# Patient Record
Sex: MALE | Race: BLACK OR AFRICAN AMERICAN | Employment: FULL TIME | URBAN - METROPOLITAN AREA
[De-identification: names, ages, dates, MRNs, and addresses within clinical notes are randomized per-mention and may not be internally consistent; named-entity substitution may affect disease eponyms.]

---

## 2019-05-27 ENCOUNTER — OFFICE VISIT (OUTPATIENT)
Dept: URGENT CARE | Facility: URGENT CARE | Age: 69
End: 2019-05-27
Payer: COMMERCIAL

## 2019-05-27 VITALS
OXYGEN SATURATION: 97 % | RESPIRATION RATE: 12 BRPM | BODY MASS INDEX: 29.4 KG/M2 | DIASTOLIC BLOOD PRESSURE: 88 MMHG | SYSTOLIC BLOOD PRESSURE: 150 MMHG | HEIGHT: 68 IN | HEART RATE: 72 BPM | TEMPERATURE: 96.5 F | WEIGHT: 194 LBS

## 2019-05-27 DIAGNOSIS — M10.9 PODAGRA: Primary | ICD-10-CM

## 2019-05-27 DIAGNOSIS — M79.672 LEFT FOOT PAIN: ICD-10-CM

## 2019-05-27 PROCEDURE — 99214 OFFICE O/P EST MOD 30 MIN: CPT | Performed by: PHYSICIAN ASSISTANT

## 2019-05-27 RX ORDER — METHYLPREDNISOLONE 4 MG
TABLET, DOSE PACK ORAL
Qty: 21 TABLET | Refills: 0 | Status: SHIPPED | OUTPATIENT
Start: 2019-05-27 | End: 2019-06-25

## 2019-05-27 RX ORDER — IBUPROFEN 200 MG
300 TABLET ORAL EVERY 4 HOURS PRN
COMMUNITY
End: 2019-06-25

## 2019-05-27 ASSESSMENT — PAIN SCALES - GENERAL: PAINLEVEL: WORST PAIN (10)

## 2019-05-27 ASSESSMENT — MIFFLIN-ST. JEOR: SCORE: 1614.48

## 2019-05-28 NOTE — PROGRESS NOTES
"SUBJECTIVE:  Chief Complaint   Patient presents with     Urgent Care     Foot Pain     Pain in left foot x 1wk  Hx of gout     Young Dickson is a 69 year old male presents with a chief complaint of left foot pain, swelling and tenderness.  How: no injury.  The patient complained of mild and moderate pain  and has not had decreased ROM.  Pain exacerbated by walking and weight-bearing.  Relieved by rest.  He treated it initially with no therapy. This is the first time this type of injury has occurred to this patient.     PAST MEDICAL HX  Gout    ALLERGIES  No Known Allergies     Social History     Tobacco Use     Smoking status: Never Smoker     Smokeless tobacco: Never Used   Substance Use Topics     Alcohol use: Not on file     FAMILY HX  HTN  CVD  Depression    ROS:  CONSTITUTIONAL:NEGATIVE for fever, chills, change in weight  INTEGUMENTARY/SKIN: POSITIVE for left foot, MTP joint pain, swelling  ENT/MOUTH: NEGATIVE for ear, mouth and throat problems  RESP:NEGATIVE for significant cough or SOB  CV: NEGATIVE for chest pain, palpitations or peripheral edema  GI: NEGATIVE for nausea, abdominal pain, heartburn, or change in bowel habits  : negative for  MUSCULOSKELETAL: POSITIVE  for left MTP joint tenderness, swelling and erythema  NEURO: NEGATIVE for weakness, dizziness or paresthesias    EXAM:   /88 (BP Location: Right arm, Patient Position: Sitting, Cuff Size: Adult Large)   Pulse 72   Temp 96.5  F (35.8  C) (Tympanic)   Resp 12   Ht 1.727 m (5' 8\")   Wt 88 kg (194 lb)   SpO2 97%   BMI 29.50 kg/m    Gen: healthy,alert,no distress  Extremity: foot has erythema, swelling and point tenderness of the left MTP joint.   There is not compromise to the distal circulation.  Pulses are +2 and CRT is brisk  CHEST: clear to auscultation  CV: regular rate and rhythm  EXTREMITIES: peripheral pulses normal  MS: no gross deformities noted, no evidence of inflammation in joints, FROM in all extremities.  SKIN: " Positive left MTP joint erythema, swelling  NEURO: Normal strength and tone, sensory exam grossly normal, mentation intact and speech normal    ASSESSMENT/PLAN      ICD-10-CM    1. Podagra M10.9 methylPREDNISolone (MEDROL DOSEPAK) 4 MG tablet therapy pack   2. Left foot pain M79.672 methylPREDNISolone (MEDROL DOSEPAK) 4 MG tablet therapy pack       Gout information given to patient  Medrol for gout attack   Do not take allopurinol for active gout flare up  Follow up with PCP

## 2019-06-25 ENCOUNTER — OFFICE VISIT (OUTPATIENT)
Dept: FAMILY MEDICINE | Facility: CLINIC | Age: 69
End: 2019-06-25
Payer: COMMERCIAL

## 2019-06-25 VITALS
OXYGEN SATURATION: 99 % | TEMPERATURE: 98.5 F | WEIGHT: 193 LBS | HEIGHT: 68 IN | HEART RATE: 92 BPM | DIASTOLIC BLOOD PRESSURE: 90 MMHG | SYSTOLIC BLOOD PRESSURE: 162 MMHG | BODY MASS INDEX: 29.25 KG/M2

## 2019-06-25 DIAGNOSIS — R03.0 ELEVATED BP WITHOUT DIAGNOSIS OF HYPERTENSION: ICD-10-CM

## 2019-06-25 DIAGNOSIS — M79.671 RIGHT FOOT PAIN: Primary | ICD-10-CM

## 2019-06-25 PROCEDURE — 99213 OFFICE O/P EST LOW 20 MIN: CPT | Performed by: PHYSICIAN ASSISTANT

## 2019-06-25 ASSESSMENT — MIFFLIN-ST. JEOR: SCORE: 1609.94

## 2019-06-25 NOTE — PATIENT INSTRUCTIONS
History/exam is most consistent with plantar fasciitis.  Please slowly add back your new exercise routine once pain has resolved.  Start icing this consistently, ensure good footwear and ok to continue use of ibuprofen for now.   Unclear if your BP is elevated due to pain versus if you have underlying hypertension.  This needs following up on.  Start tracking BP on home cuff and keep a log of this.  Return in 3 weeks or so to review BP log and come fasting for routine labs.  You should have lipids, kidney function and diabetes screening all completed at that time.

## 2019-06-25 NOTE — PROGRESS NOTES
"Subjective     Young Dickson is a 69 year old male who presents to clinic today for the following health issues:    HPI   Joint Pain    Onset: started Thursday    Description:   Location: right foot pain  Character: can't walk because it hurts     Intensity: severe at times, but today is feeling better.    Progression of Symptoms: same    Accompanying Signs & Symptoms:  Other symptoms: no swelling or bruising    History:   Previous similar pain: no     Does mention he was seen about 1 month ago for L foot pain/gout, but this pain is different and is not his joint.  Generally enjoys good health, but admits he never comes in unless he has a problem. No routine preventative health in a long time.     Precipitating factors:   Trauma or overuse: YES - just started going to the gym once per week 2 months ago. Using the treadmill for 30 minutes.     Alleviating factors:  Improved by: He has been taking ibuprofen and that is helping - if he doesn't take it he can't walk on it    Therapies Tried and outcome: ibuprofen 200mg BID since Friday and this really helps.       Patient Active Problem List   Diagnosis     Elevated BP without diagnosis of hypertension     History reviewed. No pertinent surgical history.    Social History     Tobacco Use     Smoking status: Never Smoker     Smokeless tobacco: Never Used   Substance Use Topics     Alcohol use: Not on file     History reviewed. No pertinent family history.      No current outpatient medications on file.     No Known Allergies    Reviewed and updated as needed this visit by Provider  Tobacco  Allergies  Meds  Problems  Med Hx  Surg Hx  Fam Hx  Soc Hx          Review of Systems   ROS COMP: Constitutional, HEENT, cardiovascular, pulmonary, MSK, skin systems are negative, except as otherwise noted.      Objective    /90 (BP Location: Right arm, Cuff Size: Adult Regular)   Pulse 92   Temp 98.5  F (36.9  C) (Oral)   Ht 1.727 m (5' 8\")   Wt 87.5 kg (193 lb)   " SpO2 99%   BMI 29.35 kg/m    Body mass index is 29.35 kg/m .  Physical Exam   GENERAL: healthy, alert and no distress  MS: no swelling, redness or warmth of R foot. Indicates plantar aspect of primary site of pain and is tender to palpation from mid arch to distal insertion on heel and has discomfort with passive dorsiflexion along areas of same. No ankle tenderness or pain at base of the 5th MTP.  VASC: normal DP and PT pulses.     Diagnostic Test Results:  none         Assessment & Plan       ICD-10-CM    1. Right foot pain M79.671    2. Elevated BP without diagnosis of hypertension R03.0    See Patient Instructions  Patient in agreement with plan.     Patient Instructions   History/exam is most consistent with plantar fasciitis.  Please slowly add back your new exercise routine once pain has resolved.  Start icing this consistently, ensure good footwear and ok to continue use of ibuprofen for now.   Unclear if your BP is elevated due to pain versus if you have underlying hypertension.  This needs following up on.  Start tracking BP on home cuff and keep a log of this.  Return in 3 weeks or so to review BP log and come fasting for routine labs.  You should have lipids, kidney function and diabetes screening all completed at that time.       Return in about 1 month (around 7/25/2019) for BP Recheck, Lab Work.    Jolynn Araiza PA-C  [facility]

## 2019-07-26 ENCOUNTER — OFFICE VISIT (OUTPATIENT)
Dept: FAMILY MEDICINE | Facility: CLINIC | Age: 69
End: 2019-07-26
Payer: COMMERCIAL

## 2019-07-26 VITALS
HEIGHT: 68 IN | TEMPERATURE: 98.1 F | WEIGHT: 188 LBS | DIASTOLIC BLOOD PRESSURE: 80 MMHG | BODY MASS INDEX: 28.49 KG/M2 | SYSTOLIC BLOOD PRESSURE: 156 MMHG | HEART RATE: 88 BPM | OXYGEN SATURATION: 99 %

## 2019-07-26 DIAGNOSIS — Z11.59 NEED FOR HEPATITIS C SCREENING TEST: ICD-10-CM

## 2019-07-26 DIAGNOSIS — Z23 NEED FOR TETANUS BOOSTER: ICD-10-CM

## 2019-07-26 DIAGNOSIS — I10 HYPERTENSION GOAL BP (BLOOD PRESSURE) < 140/90: Primary | ICD-10-CM

## 2019-07-26 DIAGNOSIS — Z23 NEED FOR PNEUMOCOCCAL VACCINATION: ICD-10-CM

## 2019-07-26 DIAGNOSIS — Z12.5 SCREENING FOR PROSTATE CANCER: ICD-10-CM

## 2019-07-26 DIAGNOSIS — Z13.6 CARDIOVASCULAR SCREENING; LDL GOAL LESS THAN 160: ICD-10-CM

## 2019-07-26 DIAGNOSIS — E66.3 OVERWEIGHT (BMI 25.0-29.9): ICD-10-CM

## 2019-07-26 LAB
ALBUMIN SERPL-MCNC: 3.7 G/DL (ref 3.4–5)
ALP SERPL-CCNC: 73 U/L (ref 40–150)
ALT SERPL W P-5'-P-CCNC: 27 U/L (ref 0–70)
ANION GAP SERPL CALCULATED.3IONS-SCNC: 3 MMOL/L (ref 3–14)
AST SERPL W P-5'-P-CCNC: 15 U/L (ref 0–45)
BILIRUB SERPL-MCNC: 0.5 MG/DL (ref 0.2–1.3)
BUN SERPL-MCNC: 10 MG/DL (ref 7–30)
CALCIUM SERPL-MCNC: 8.4 MG/DL (ref 8.5–10.1)
CHLORIDE SERPL-SCNC: 108 MMOL/L (ref 94–109)
CHOLEST SERPL-MCNC: 189 MG/DL
CO2 SERPL-SCNC: 26 MMOL/L (ref 20–32)
CREAT SERPL-MCNC: 0.93 MG/DL (ref 0.66–1.25)
CREAT UR-MCNC: NORMAL MG/DL
GFR SERPL CREATININE-BSD FRML MDRD: 83 ML/MIN/{1.73_M2}
GLUCOSE SERPL-MCNC: 91 MG/DL (ref 70–99)
HDLC SERPL-MCNC: 52 MG/DL
LDLC SERPL CALC-MCNC: 122 MG/DL
MICROALBUMIN UR-MCNC: NORMAL MG/L
MICROALBUMIN/CREAT UR: NORMAL MG/G CR (ref 0–17)
NONHDLC SERPL-MCNC: 137 MG/DL
POTASSIUM SERPL-SCNC: 4.2 MMOL/L (ref 3.4–5.3)
PROT SERPL-MCNC: 7.4 G/DL (ref 6.8–8.8)
PSA SERPL-ACNC: 0.81 UG/L (ref 0–4)
SODIUM SERPL-SCNC: 137 MMOL/L (ref 133–144)
TRIGL SERPL-MCNC: 74 MG/DL

## 2019-07-26 PROCEDURE — 80061 LIPID PANEL: CPT | Performed by: PHYSICIAN ASSISTANT

## 2019-07-26 PROCEDURE — G0103 PSA SCREENING: HCPCS | Performed by: PHYSICIAN ASSISTANT

## 2019-07-26 PROCEDURE — 99213 OFFICE O/P EST LOW 20 MIN: CPT | Mod: 25 | Performed by: PHYSICIAN ASSISTANT

## 2019-07-26 PROCEDURE — 90715 TDAP VACCINE 7 YRS/> IM: CPT | Performed by: PHYSICIAN ASSISTANT

## 2019-07-26 PROCEDURE — 36415 COLL VENOUS BLD VENIPUNCTURE: CPT | Performed by: PHYSICIAN ASSISTANT

## 2019-07-26 PROCEDURE — 90471 IMMUNIZATION ADMIN: CPT | Performed by: PHYSICIAN ASSISTANT

## 2019-07-26 PROCEDURE — 80053 COMPREHEN METABOLIC PANEL: CPT | Performed by: PHYSICIAN ASSISTANT

## 2019-07-26 PROCEDURE — 90670 PCV13 VACCINE IM: CPT | Performed by: PHYSICIAN ASSISTANT

## 2019-07-26 PROCEDURE — 86803 HEPATITIS C AB TEST: CPT | Performed by: PHYSICIAN ASSISTANT

## 2019-07-26 PROCEDURE — 90472 IMMUNIZATION ADMIN EACH ADD: CPT | Performed by: PHYSICIAN ASSISTANT

## 2019-07-26 RX ORDER — LISINOPRIL 20 MG/1
20 TABLET ORAL DAILY
Qty: 30 TABLET | Refills: 1 | Status: SHIPPED | OUTPATIENT
Start: 2019-07-26 | End: 2019-07-29 | Stop reason: SINTOL

## 2019-07-26 ASSESSMENT — MIFFLIN-ST. JEOR: SCORE: 1587.26

## 2019-07-26 NOTE — PROGRESS NOTES
Subjective     Young Dickson is a 69 year old male who presents to clinic today for the following health issues:    HPI   Hypertension Follow-up; was advised to track BPs since last appt and return for review today. Unfortunately, he has only checked his BP once, but does report that it was 166 systolic.  Recheck today again shows elevation of 156/80 so reviewed that he likely has underlying HTN and due to potential negative risks of not treating this a medication would be indicated. He is amenable to this.  Fasting for labs.   Drinks 2 glasses of wine per night.  Not sure if he snores. No known apnea.   Sochx: [place]      Do you check your blood pressure regularly outside of the clinic? No     Are you following a low salt diet? No    Are your blood pressures ever more than 140 on the top number (systolic) OR more   than 90 on the bottom number (diastolic), for example 140/90? Yes    Amount of exercise or physical activity: None    Problems taking medications regularly: n/a    Medication side effects: n/a    Diet: regular (no restrictions)      Patient Active Problem List   Diagnosis     Overweight (BMI 25.0-29.9)     Hypertension goal BP (blood pressure) < 140/90     History reviewed. No pertinent surgical history.    Social History     Tobacco Use     Smoking status: Never Smoker     Smokeless tobacco: Never Used   Substance Use Topics     Alcohol use: Not on file     History reviewed. No pertinent family history.      Current Outpatient Medications   Medication Sig Dispense Refill     lisinopril (PRINIVIL/ZESTRIL) 20 MG tablet Take 1 tablet (20 mg) by mouth daily 30 tablet 1     No Known Allergies      Reviewed and updated as needed this visit by Provider  Tobacco  Allergies  Meds  Problems  Med Hx  Surg Hx  Fam Hx  Soc Hx          Review of Systems   ROS COMP: Constitutional, HEENT, cardiovascular, pulmonary, gi and gu systems are negative, except as otherwise noted.      Objective    /80 (BP  "Location: Right arm, Cuff Size: Adult Regular)   Pulse 88   Temp 98.1  F (36.7  C) (Oral)   Ht 1.727 m (5' 8\")   Wt 85.3 kg (188 lb)   SpO2 99%   BMI 28.59 kg/m    Body mass index is 28.59 kg/m .  Physical Exam   GENERAL: healthy, alert and no distress  EYES: Eyes grossly normal to inspection, PERRL and conjunctivae and sclerae normal  RESP: lungs clear to auscultation - no rales, rhonchi or wheezes  CV: regular rates and rhythm and no murmur, click or rub    Diagnostic Test Results:  none         Assessment & Plan       ICD-10-CM    1. Hypertension goal BP (blood pressure) < 140/90 I10 Comprehensive metabolic panel     Albumin Random Urine Quantitative with Creat Ratio     lisinopril (PRINIVIL/ZESTRIL) 20 MG tablet     Albumin Random Urine Quantitative with Creat Ratio   2. Need for hepatitis C screening test Z11.59 Hepatitis C Screen Reflex to HCV RNA Quant and Genotype   3. CARDIOVASCULAR SCREENING; LDL GOAL LESS THAN 160 Z13.6 Lipid panel reflex to direct LDL Fasting   4. Screening for prostate cancer Z12.5 PSA, screen   5. Need for pneumococcal vaccination Z23 Pneumococcal vaccine 13 valent PCV13 IM (Prevnar) [30861]   6. Need for tetanus booster Z23 ADMIN 1st VACCINE     TDAP VACCINE (ADACEL)   7. Overweight (BMI 25.0-29.9) E66.3    BP remains elevated - likely has underlying HTN and pt amenable to starting medication. Reviewed that due to his ethnicity and degree of BP elevation that typically CCB are used as next line of treatment if lisinopril ineffective as monotherapy. Thus, would consider addition of norvasc as next line agent if BP not at goal at follow-up.   Check baseline labs and updated immunizations today.  Can review remainder of preventative health care items at physical.     BMI:   Estimated body mass index is 28.59 kg/m  as calculated from the following:    Height as of this encounter: 1.727 m (5' 8\").    Weight as of this encounter: 85.3 kg (188 lb).   Weight management plan: Discussed " healthy diet and exercise guidelines    See Patient Instructions  Patient in agreement with plan.     Patient Instructions   Thanks for coming back.  Ongoing elevated BP suggests you have true underlying high BP.  Start medication to help manage this. Increasing exercise, loosing weight, following a low salt diet and minimizing alcohol use can help keep your BP in good control.   Will check additional blood work today and can notify of results when available.  If kidney function is normal, start lisinopril as discussed.   Track BPs at pharmacy or obtain home cuff.       Return in about 1 month (around 8/26/2019) for recheck BP, can review your labs as part of physical..    Jolynn Araiza PA-C  [facility] [place]

## 2019-07-26 NOTE — LETTER
July 31, 2019      Young Dickson  [address] [address]        Dear ,    We are writing to inform you of your test results.    -PSA (prostate specific antigen) test is normal.   -LDL(bad) cholesterol level is elevated which can increase your heart disease risk.  A diet high in fat and simple carbohydrates, genetics and being overweight can contribute to this. ADVISE: exercising 150 minutes of aerobic exercise per week (30 minutes for 5 days per week or 50 minutes for 3 days per week are options) and eating a low saturated fat/low carbohydrate diet are helpful to improve this.  You should discuss the pro's and con's of starting a statin to reduce your risk of coronary artery disease with Jolynn when you see her in late August.  We recommend discussion of initiation of a statin when your 10 year risk is above 7.5%.  Your's is listed below and is well above this, mainly due to your age.   -The 10-year ASCVD risk score (Ariancristi VARMA Jr., et al., 2013) is: 25.8%     Values used to calculate the score:       Age: 69 years       Sex: Male       Is Non- : Yes       Diabetic: No       Tobacco smoker: No       Systolic Blood Pressure: 156 mmHg       Is BP treated: Yes       HDL Cholesterol: 52 mg/dL       Total Cholesterol: 189 mg/dL   -Liver and gallbladder tests are normal (ALT,AST, Alk phos, bilirubin), kidney function is normal (Cr, GFR), sodium is normal, potassium is normal, calcium is normal, glucose is normal.   -Hepatitis C antibody screen test shows no signs of a previous hepatitis C infection.     Resulted Orders   Comprehensive metabolic panel   Result Value Ref Range    Sodium 137 133 - 144 mmol/L    Potassium 4.2 3.4 - 5.3 mmol/L    Chloride 108 94 - 109 mmol/L    Carbon Dioxide 26 20 - 32 mmol/L    Anion Gap 3 3 - 14 mmol/L    Glucose 91 70 - 99 mg/dL      Comment:      Fasting specimen    Urea Nitrogen 10 7 - 30 mg/dL    Creatinine 0.93 0.66 - 1.25 mg/dL    GFR Estimate 83  >60 mL/min/[1.73_m2]      Comment:      Non  GFR Calc  Starting 12/18/2018, serum creatinine based estimated GFR (eGFR) will be   calculated using the Chronic Kidney Disease Epidemiology Collaboration   (CKD-EPI) equation.      GFR Estimate If Black >90 >60 mL/min/[1.73_m2]      Comment:       GFR Calc  Starting 12/18/2018, serum creatinine based estimated GFR (eGFR) will be   calculated using the Chronic Kidney Disease Epidemiology Collaboration   (CKD-EPI) equation.      Calcium 8.4 (L) 8.5 - 10.1 mg/dL    Bilirubin Total 0.5 0.2 - 1.3 mg/dL    Albumin 3.7 3.4 - 5.0 g/dL    Protein Total 7.4 6.8 - 8.8 g/dL    Alkaline Phosphatase 73 40 - 150 U/L    ALT 27 0 - 70 U/L    AST 15 0 - 45 U/L   Lipid panel reflex to direct LDL Fasting   Result Value Ref Range    Cholesterol 189 <200 mg/dL    Triglycerides 74 <150 mg/dL      Comment:      Fasting specimen    HDL Cholesterol 52 >39 mg/dL    LDL Cholesterol Calculated 122 (H) <100 mg/dL      Comment:      Above desirable:  100-129 mg/dl  Borderline High:  130-159 mg/dL  High:             160-189 mg/dL  Very high:       >189 mg/dl      Non HDL Cholesterol 137 (H) <130 mg/dL      Comment:      Above Desirable:  130-159 mg/dl  Borderline high:  160-189 mg/dl  High:             190-219 mg/dl  Very high:       >219 mg/dl     Hepatitis C Screen Reflex to HCV RNA Quant and Genotype   Result Value Ref Range    Hepatitis C Antibody Nonreactive NR^Nonreactive      Comment:      Assay performance characteristics have not been established for newborns,   infants, and children     PSA, screen   Result Value Ref Range    PSA 0.81 0 - 4 ug/L      Comment:      Assay Method:  Chemiluminescence using Siemens Vista analyzer       If you have any questions or concerns, please call the clinic at the number listed above.       Sincerely,        Jolynn Araiza PA-C

## 2019-07-26 NOTE — PATIENT INSTRUCTIONS
Thanks for coming back.  Ongoing elevated BP suggests you have true underlying high BP.  Start medication to help manage this. Increasing exercise, loosing weight, following a low salt diet and minimizing alcohol use can help keep your BP in good control.   Will check additional blood work today and can notify of results when available.  If kidney function is normal, start lisinopril as discussed.   Track BPs at pharmacy or obtain home cuff.

## 2019-07-29 ENCOUNTER — TELEPHONE (OUTPATIENT)
Dept: FAMILY MEDICINE | Facility: CLINIC | Age: 69
End: 2019-07-29

## 2019-07-29 DIAGNOSIS — I10 HYPERTENSION GOAL BP (BLOOD PRESSURE) < 140/90: Primary | ICD-10-CM

## 2019-07-29 LAB — HCV AB SERPL QL IA: NONREACTIVE

## 2019-07-29 RX ORDER — LOSARTAN POTASSIUM 100 MG/1
100 TABLET ORAL DAILY
Qty: 30 TABLET | Refills: 0 | Status: SHIPPED | OUTPATIENT
Start: 2019-07-29 | End: 2019-08-30

## 2019-07-29 NOTE — TELEPHONE ENCOUNTER
Reason for Call:  Medication or medication refill:    Do you use a [facility]?  Name of the pharmacy and phone number for the current request:  [facility] in  on [address]    Name of the medication requested: change lisinopril to losartan or equivalent     Other request: Rcvd fax from [facility] stating pt has reported the dry cough from Lisinopril.  Asking if we can change to Losartan or equivalent.    Can we leave a detailed message on this number? Not Applicable    Phone number patient can be reached at: Other phone number:  [phone]    Best Time:     Call taken on 7/29/2019 at 11:18 AM by Kim Morales

## 2019-07-29 NOTE — TELEPHONE ENCOUNTER
To change from lisinopril to losartan, would advise starting losartan at 100 mg. Rx sent to pharmacy. He has follow up scheduled on 8/30 to recheck blood pressure. Recommend checking blood pressure at home in a couple weeks.    Dick Lyles,   7/29/2019 12:59 PM

## 2019-08-30 ENCOUNTER — OFFICE VISIT (OUTPATIENT)
Dept: FAMILY MEDICINE | Facility: CLINIC | Age: 69
End: 2019-08-30
Payer: COMMERCIAL

## 2019-08-30 VITALS
SYSTOLIC BLOOD PRESSURE: 138 MMHG | TEMPERATURE: 97.9 F | HEART RATE: 78 BPM | BODY MASS INDEX: 27.58 KG/M2 | WEIGHT: 182 LBS | DIASTOLIC BLOOD PRESSURE: 78 MMHG | OXYGEN SATURATION: 99 % | HEIGHT: 68 IN

## 2019-08-30 DIAGNOSIS — E78.5 HYPERLIPIDEMIA LDL GOAL <130: ICD-10-CM

## 2019-08-30 DIAGNOSIS — R51.9 NONINTRACTABLE EPISODIC HEADACHE, UNSPECIFIED HEADACHE TYPE: ICD-10-CM

## 2019-08-30 DIAGNOSIS — Z00.00 ENCOUNTER FOR MEDICARE ANNUAL WELLNESS EXAM: Primary | ICD-10-CM

## 2019-08-30 DIAGNOSIS — Z12.11 SPECIAL SCREENING FOR MALIGNANT NEOPLASMS, COLON: ICD-10-CM

## 2019-08-30 DIAGNOSIS — I10 HYPERTENSION GOAL BP (BLOOD PRESSURE) < 140/90: ICD-10-CM

## 2019-08-30 PROBLEM — E66.3 OVERWEIGHT (BMI 25.0-29.9): Status: RESOLVED | Noted: 2019-07-26 | Resolved: 2019-08-30

## 2019-08-30 PROCEDURE — 99397 PER PM REEVAL EST PAT 65+ YR: CPT | Performed by: PHYSICIAN ASSISTANT

## 2019-08-30 RX ORDER — AMLODIPINE BESYLATE 5 MG/1
5 TABLET ORAL DAILY
Qty: 30 TABLET | Refills: 0 | Status: SHIPPED | OUTPATIENT
Start: 2019-08-30 | End: 2019-12-20

## 2019-08-30 RX ORDER — LOSARTAN POTASSIUM 100 MG/1
100 TABLET ORAL DAILY
Qty: 90 TABLET | Refills: 3 | Status: SHIPPED | OUTPATIENT
Start: 2019-08-30 | End: 2020-10-05

## 2019-08-30 RX ORDER — ATORVASTATIN CALCIUM 20 MG/1
20 TABLET, FILM COATED ORAL DAILY
Qty: 90 TABLET | Refills: 3 | Status: SHIPPED | OUTPATIENT
Start: 2019-08-30 | End: 2020-09-01

## 2019-08-30 ASSESSMENT — MIFFLIN-ST. JEOR: SCORE: 1560.05

## 2019-08-30 NOTE — PATIENT INSTRUCTIONS
Patient Education   Personalized Prevention Plan  You are due for the preventive services outlined below.  Your care team is available to assist you in scheduling these services.  If you have already completed any of these items, please share that information with your care team to update in your medical record.  Health Maintenance Due   Topic Date Due     Discuss Advance Care Planning  1950     Colonscopy  01/01/1959     Zoster (Shingles) Vaccine (1 of 2) 01/01/1999     Annual Wellness Visit  01/01/2014     AORTIC ANEURYSM SCREENING (SYSTEM ASSIGNED)  01/01/2014

## 2019-08-30 NOTE — PROGRESS NOTES
"  SUBJECTIVE:   Young Dickson is a 69 year old male who presents for Preventive Visit.    Are you in the first 12 months of your Medicare Part B coverage?  No    Physical Health:    In general, how would you rate your overall physical health? Not good    Outside of work, how many days during the week do you exercise? 2-3 days/week    Outside of work, approximately how many minutes a day do you exercise?30-45 minutes    If you drink alcohol do you typically have >3 drinks per day or >7 drinks per week? No    Do you usually eat at least 4 servings of fruit and vegetables a day, include whole grains & fiber and avoid regularly eating high fat or \"junk\" foods? Yes    Do you have any problems taking medications regularly?  No    Do you have any side effects from medications? not applicable    Needs assistance for the following daily activities: no assistance needed    Which of the following safety concerns are present in your home?  none identified     Hearing impairment: No    In the past 6 months, have you been bothered by leaking of urine? no    Mental Health:    In general, how would you rate your overall mental or emotional health? good  PHQ-2 Score:      Do you feel safe in your environment? Yes    Do you have a Health Care Directive? Yes: Advance Directive has been received and scanned.    Additional concerns to address?  Concerned about Blood pressure  Developed dry cough from lisinopril so was switched to losartan. Taking now for 1 month. Tolerating this one better.  Has been tracking BP on cuff at [facility]   Of 14 readings only 1 is < 140/90  Range is 131-157/79-94  Also mentions he seems to be having a HA periodically, but isn't sure if it's related to his BP or medication. Estimates he has these off/on. Hasn't really been tracking though so isn't sure how often they've been happening. Describes as \"mild and not serious\" and generally feels only lasts a few minutes and not overly bothersome to him. Reviewed " losartan in micromedex and HA is not listed as common side effect. He will continue to monitor and follow-up with any concerns.     Review labs:  The 10-year ASCVD risk score (Arian VARMA Jr., et al., 2013) is: 21%    Values used to calculate the score:      Age: 69 years      Sex: Male      Is Non- : Yes      Diabetic: No      Tobacco smoker: No      Systolic Blood Pressure: 138 mmHg      Is BP treated: Yes      HDL Cholesterol: 52 mg/dL      Total Cholesterol: 189 mg/dL    Results for orders placed or performed in visit on 07/26/19   Comprehensive metabolic panel   Result Value Ref Range    Sodium 137 133 - 144 mmol/L    Potassium 4.2 3.4 - 5.3 mmol/L    Chloride 108 94 - 109 mmol/L    Carbon Dioxide 26 20 - 32 mmol/L    Anion Gap 3 3 - 14 mmol/L    Glucose 91 70 - 99 mg/dL    Urea Nitrogen 10 7 - 30 mg/dL    Creatinine 0.93 0.66 - 1.25 mg/dL    GFR Estimate 83 >60 mL/min/[1.73_m2]    GFR Estimate If Black >90 >60 mL/min/[1.73_m2]    Calcium 8.4 (L) 8.5 - 10.1 mg/dL    Bilirubin Total 0.5 0.2 - 1.3 mg/dL    Albumin 3.7 3.4 - 5.0 g/dL    Protein Total 7.4 6.8 - 8.8 g/dL    Alkaline Phosphatase 73 40 - 150 U/L    ALT 27 0 - 70 U/L    AST 15 0 - 45 U/L   Lipid panel reflex to direct LDL Fasting   Result Value Ref Range    Cholesterol 189 <200 mg/dL    Triglycerides 74 <150 mg/dL    HDL Cholesterol 52 >39 mg/dL    LDL Cholesterol Calculated 122 (H) <100 mg/dL    Non HDL Cholesterol 137 (H) <130 mg/dL   Hepatitis C Screen Reflex to HCV RNA Quant and Genotype   Result Value Ref Range    Hepatitis C Antibody Nonreactive NR^Nonreactive   Albumin Random Urine Quantitative with Creat Ratio   Result Value Ref Range    Creatinine Urine Canceled, Test credited mg/dL    Albumin Urine mg/L Canceled, Test credited mg/L    Albumin Urine mg/g Cr Canceled, Test credited 0 - 17 mg/g Cr   PSA, screen   Result Value Ref Range    PSA 0.81 0 - 4 ug/L       Fall risk:  Fallen 2 or more times in the past year?: No  Any  fall with injury in the past year?: No    Cognitive Screenin) Repeat 3 items (Leader, Season, Table)    2) Clock draw: NORMAL  3) 3 item recall: Recalls 1 object   Results: NORMAL clock, 1-2 items recalled: COGNITIVE IMPAIRMENT LESS LIKELY    Mini-CogTM Copyright KISHORE Steward. Licensed by the author for use in [facility]; reprinted with permission (nico@[employer]). All rights reserved.      Do you have sleep apnea, excessive snoring or daytime drowsiness?: no            Reviewed and updated as needed this visit by clinical staff  Tobacco  Allergies  Meds  Problems         Reviewed and updated as needed this visit by Provider  Problems        Social History     Tobacco Use     Smoking status: Never Smoker     Smokeless tobacco: Never Used   Substance Use Topics     Alcohol use: Not on file                           Current providers sharing in care for this patient include:   Patient Care Team:  No Ref-Primary, Physician as PCP - Jolynn Pineda PA-C as Assigned PCP    The following health maintenance items are reviewed in Epic and correct as of today:  Health Maintenance   Topic Date Due     ADVANCE CARE PLANNING  1950     COLONOSCOPY  1959     ZOSTER IMMUNIZATION (1 of 2) 1999     MEDICARE ANNUAL WELLNESS VISIT  2014     AORTIC ANEURYSM SCREENING (SYSTEM ASSIGNED)  2019 (Originally 2014)     INFLUENZA VACCINE (1) 2019     FALL RISK ASSESSMENT  2020     PNEUMOCOCCAL IMMUNIZATION 65+ LOW/MEDIUM RISK (2 of 2 - PPSV23) 2020     LIPID  2024     DTAP/TDAP/TD IMMUNIZATION (2 - Td) 2029     HEPATITIS C SCREENING  Completed     PHQ-2  Completed     IPV IMMUNIZATION  Aged Out     MENINGITIS IMMUNIZATION  Aged Out     BP Readings from Last 3 Encounters:   19 138/78   19 156/80   19 162/90    Wt Readings from Last 3 Encounters:   19 82.6 kg (182 lb)   19 85.3 kg (188 lb)   19 87.5 kg (193  "lb)                  Patient Active Problem List   Diagnosis     Overweight (BMI 25.0-29.9)     Hypertension goal BP (blood pressure) < 140/90     Hyperlipidemia LDL goal <130     No past surgical history on file.    Social History     Tobacco Use     Smoking status: Never Smoker     Smokeless tobacco: Never Used   Substance Use Topics     Alcohol use: Not on file     No family history on file.      Current Outpatient Medications   Medication Sig Dispense Refill     amLODIPine (NORVASC) 5 MG tablet Take 1 tablet (5 mg) by mouth daily 30 tablet 0     atorvastatin (LIPITOR) 20 MG tablet Take 1 tablet (20 mg) by mouth daily 90 tablet 3     losartan (COZAAR) 100 MG tablet Take 1 tablet (100 mg) by mouth daily 90 tablet 3     Allergies   Allergen Reactions     Lisinopril Cough     Pneumonia Vaccine:Adults age 65+ who received Pneumovax (PPSV23) at 65 years or older: Should be given PCV13 > 1 year after their most recent PPSV23    ROS:  Constitutional, HEENT, cardiovascular, pulmonary, GI, , musculoskeletal, neuro systems are negative, except as otherwise noted.    OBJECTIVE:   /78 (BP Location: Right arm, Cuff Size: Adult Regular)   Pulse 78   Temp 97.9  F (36.6  C) (Oral)   Ht 1.727 m (5' 8\")   Wt 82.6 kg (182 lb)   SpO2 99%   BMI 27.67 kg/m   Estimated body mass index is 27.67 kg/m  as calculated from the following:    Height as of this encounter: 1.727 m (5' 8\").    Weight as of this encounter: 82.6 kg (182 lb).  EXAM:   GENERAL: healthy, alert and no distress  EYES: Eyes grossly normal to inspection, PERRL and conjunctivae and sclerae normal  HENT: ear canals and TM's normal, nose and mouth without ulcers or lesions  NECK: no adenopathy, no asymmetry, masses, or scars and thyroid normal to palpation  RESP: lungs clear to auscultation - no rales, rhonchi or wheezes  CV: regular rate and rhythm, normal S1 S2, no S3 or S4, no murmur, click or rub, no peripheral edema and peripheral pulses strong  ABDOMEN: " soft, nontender, no hepatosplenomegaly, no masses and bowel sounds normal  MS: no gross musculoskeletal defects noted, no edema  SKIN: no suspicious lesions or rashes  NEURO: Normal strength and tone, mentation intact and speech normal  PSYCH: mentation appears normal, affect normal/bright    Diagnostic Test Results:  Labs reviewed in Epic    ASSESSMENT / PLAN:       ICD-10-CM    1. Encounter for Medicare annual wellness exam Z00.00    2. Hypertension goal BP (blood pressure) < 140/90 I10 losartan (COZAAR) 100 MG tablet     amLODIPine (NORVASC) 5 MG tablet     RN HTN MGMT   3. Hyperlipidemia LDL goal <130 E78.5 atorvastatin (LIPITOR) 20 MG tablet   4. Special screening for malignant neoplasms, colon Z12.11 GASTROENTEROLOGY ADULT REF PROCEDURE ONLY [facility] Are[place]an[facility] [phone]   5. Nonintractable episodic headache, unspecified headache type R51    Pt had dry cough with lisinopril so was transitioned to losartan and BP log review today shows all elevated excluding 1. Thus, will proceed with addition of norvasc and pt will have ongoing titration with RN. Did review risks/benefits of addition of hydrochlorothiazide as well in case norvasc is not sufficient which can be added as a 3rd line agent as needed.  Also reviewed that 10-yr heart disease risk assessment score is > 7.5% and current guidelines recommend starting statin therapy for CVD prevention. Pt's score is 21% and he is amenable to starting lipitor. Can repeat lipids in 3 months for re-assessment. Future orders placed for this and CMP to repeat hepatic panel.   Has never had a colonoscopy so is amenable to this.  Lastly he also mentioned periodic HA, but this is mild and usually only lasting minutes. Pt had hard time qualifying and wonder if this is due to her HTN as is not listed as a SE of losartan. He was encouraged to track and see if he can notice any pattern, but we may find they improve as we continue to get BP back into WNL. He will follow-up with  "any worsening or concerns.    End of Life Planning:  Patient currently has an advanced directive: Yes.     COUNSELING:  Reviewed preventive health counseling, as reflected in patient instructions       Consider AAA screening for ages 65-75 and smoking history       Regular exercise       Healthy diet/nutrition       Hepatitis C screening       HIV screening for high risk patient       Colon cancer screening       Prostate cancer screening    Estimated body mass index is 27.67 kg/m  as calculated from the following:    Height as of this encounter: 1.727 m (5' 8\").    Weight as of this encounter: 82.6 kg (182 lb).         reports that he has never smoked. He has never used smokeless tobacco.      Appropriate preventive services were discussed with this patient, including applicable screening as appropriate for cardiovascular disease, diabetes, osteopenia/osteoporosis, and glaucoma.  As appropriate for age/gender, discussed screening for colorectal cancer, prostate cancer, breast cancer, and cervical cancer. Checklist reviewing preventive services available has been given to the patient.    Reviewed patients plan of care and provided an AVS. The Basic Care Plan (routine screening as documented in Health Maintenance) for Young meets the Care Plan requirement. This Care Plan has been established and reviewed with the Patient.    Counseling Resources:  ATP IV Guidelines  Pooled Cohorts Equation Calculator  Breast Cancer Risk Calculator  FRAX Risk Assessment  ICSI Preventive Guidelines  Dietary Guidelines for Americans, 2010  USDA's MyPlate  ASA Prophylaxis  Lung CA Screening    Jolynn Araiza PA-C  [facility]  "

## 2019-12-26 ENCOUNTER — TELEPHONE (OUTPATIENT)
Dept: FAMILY MEDICINE | Facility: CLINIC | Age: 69
End: 2019-12-26

## 2019-12-26 NOTE — TELEPHONE ENCOUNTER
Reason for Call:  Other prescription    Detailed comments: Pt has a question about his rx that was sent to the pharmacy. Stated that he picked up his rx but that the pharmacy told him to give us a call. Pt did not want to share much information. Question possibly regarding the refill for next time.    Phone Number Patient can be reached at: Home number on file [phone] (home)    Best Time: any    Can we leave a detailed message on this number? YES    Call taken on 12/26/2019 at 1:36 PM by Sil Jolly

## 2019-12-26 NOTE — TELEPHONE ENCOUNTER
I spoke to Young he was questioning about lisinopril. Records reviewed and I was able to find it was discontinued on 7/29/19 due to a cough and he was started on losartan instead at that time. He will update his pharmacy that medication was discontinued. Amarilys Noguera R.N.

## 2020-02-11 ENCOUNTER — OFFICE VISIT (OUTPATIENT)
Dept: FAMILY MEDICINE | Facility: CLINIC | Age: 70
End: 2020-02-11
Payer: COMMERCIAL

## 2020-02-11 VITALS
HEART RATE: 73 BPM | DIASTOLIC BLOOD PRESSURE: 90 MMHG | BODY MASS INDEX: 29.25 KG/M2 | SYSTOLIC BLOOD PRESSURE: 152 MMHG | HEIGHT: 68 IN | TEMPERATURE: 98 F | OXYGEN SATURATION: 100 % | WEIGHT: 193 LBS

## 2020-02-11 DIAGNOSIS — I10 HYPERTENSION GOAL BP (BLOOD PRESSURE) < 140/90: ICD-10-CM

## 2020-02-11 DIAGNOSIS — H53.9 VISION CHANGES: Primary | ICD-10-CM

## 2020-02-11 PROCEDURE — 99214 OFFICE O/P EST MOD 30 MIN: CPT | Performed by: NURSE PRACTITIONER

## 2020-02-11 ASSESSMENT — MIFFLIN-ST. JEOR: SCORE: 1604.94

## 2020-02-11 NOTE — PROGRESS NOTES
"Subjective     Young Dickson is a 70 year old male who presents to clinic today for the following health issues:    HPI       Eye(s) Problem  Onset: x 2 days     Description:   Location: bilateral  Left eye with floater.    Pain: no   Redness: no     Accompanying Signs & Symptoms:  Discharge/mattering: no   Swelling: no   Visual changes: YES- something moves - dark black  Fever: no   Nasal Congestion: YES  Bothered by bright lights: no     History:   Trauma: no   Foreign body exposure: no     Precipitating factors:   Wearing contacts: no   Wears eye glasses    Alleviating factors:  Improved by: nothing    Therapies Tried and outcome: nothing    Doesn't have a regular eye doctor.      Stopped taking antihypertensives 2 weeks ago.   120's/80's  Patient Active Problem List   Diagnosis     Hypertension goal BP (blood pressure) < 140/90     Hyperlipidemia LDL goal <130     No past surgical history on file.    Social History     Tobacco Use     Smoking status: Never Smoker     Smokeless tobacco: Never Used   Substance Use Topics     Alcohol use: Not on file     No family history on file.      Current Outpatient Medications   Medication Sig Dispense Refill     amLODIPine (NORVASC) 5 MG tablet TAKE 1 TABLET(5 MG) BY MOUTH DAILY 90 tablet 2     atorvastatin (LIPITOR) 20 MG tablet Take 1 tablet (20 mg) by mouth daily 90 tablet 3     losartan (COZAAR) 100 MG tablet Take 1 tablet (100 mg) by mouth daily 90 tablet 3     Allergies   Allergen Reactions     Lisinopril Cough       Reviewed and updated as needed this visit by Provider         Review of Systems   ROS COMP: Constitutional, HEENT, cardiovascular, pulmonary, gi and gu systems are negative, except as otherwise noted.      Objective    BP (!) 152/90   Pulse 73   Temp 98  F (36.7  C) (Oral)   Ht 1.727 m (5' 8\")   Wt 87.5 kg (193 lb)   SpO2 100%   BMI 29.35 kg/m    Body mass index is 29.35 kg/m .  Physical Exam     GENERAL: healthy, alert and no distress  EYES: Eyes " grossly normal to inspection, PERRL and conjunctivae and sclerae normal  HENT: ear canals and TM's normal, nose and mouth without ulcers or lesions  NECK: no adenopathy, no asymmetry  RESP: lungs clear to auscultation - no rales, rhonchi or wheezes  CV: regular rate and rhythm, normal S1 S2, no S3 or S4, no murmur  PSYCH: mentation appears normal, affect normal/bright            Assessment & Plan   Young was seen today for eye problem.    Diagnoses and all orders for this visit:    Vision changes  Visual disturbance described as a black spot, no pain, blurry vision or erythema/swelling of eye.   Will plan further evaluation with ophthalmology.    -     OPHTHALMOLOGY ADULT REFERRAL    Hypertension goal BP (blood pressure) < 140/90  Patient reports stopping antihypertensives 2 weeks ago due to having well controlled blood pressures.    Blood pressure elevated at today's visit.   Encouraged patient to restart Amlodipine 5 mg and Losartan, 100 mg daily and plan follow-up in clinic for recheck of BP in 1 month.          Return in about 1 month (around 3/11/2020) for BP Recheck.    ARTI Leon [facility]

## 2020-08-29 DIAGNOSIS — E78.5 HYPERLIPIDEMIA LDL GOAL <130: ICD-10-CM

## 2020-08-29 NOTE — LETTER
[facility]  [address]  [place] [address] [phone]  September 9, 2020    Young Dickson  [address] [facility] [address]    Dear Young,    We received a refill request from your pharmacy for your  medication.  At this time the nurses were able to give you a lissette refill, but you are due to be seen for a medicare annual wellness office visit before the next refill.  This appointment can be scheduled by calling [phone] or can be scheduled via [employer] as well.      Here is a list of Health Maintenance topics that are due now or due soon:  Health Maintenance Due   Topic Date Due     ANNUAL REVIEW OF HM ORDERS  1950     ADVANCE CARE PLANNING  1950     COLORECTAL CANCER SCREENING  01/01/1959     ZOSTER IMMUNIZATION (1 of 2) 01/01/1999     AORTIC ANEURYSM SCREENING (SYSTEM ASSIGNED)  01/01/2014     PHQ-2  01/01/2020     FALL RISK ASSESSMENT  08/30/2020     INFLUENZA VACCINE (1) 09/01/2020     PNEUMOCOCCAL IMMUNIZATION 65+ LOW/MEDIUM RISK (2 of 2 - PPSV23) 07/26/2020     MEDICARE ANNUAL WELLNESS VISIT  08/30/2020       Please call us at [phone] (or use [employer]) to address the above recommendations.     Thank you for trusting [facility] and we appreciate the opportunity to serve you.  We look forward to supporting your healthcare needs in the future.    DAISY [facility] Care Team

## 2020-08-31 ENCOUNTER — TELEPHONE (OUTPATIENT)
Dept: FAMILY MEDICINE | Facility: CLINIC | Age: 70
End: 2020-08-31

## 2020-08-31 NOTE — LETTER
[facility]  [address]  [facility] [id number] [phone]  August 31, 2020    Young Dickson  [address] [facility] [address]    Dear Young,    I care about your health and have reviewed your health plan. I have reviewed your medical conditions, medication list, and lab results and am making recommendations based on this review, to better manage your health.    You are in particular need of attention regarding:  -Wellness (Physical) Visit     I am recommending that you:  -schedule a WELLNESS (Physical) APPOINTMENT with me.   I will check fasting labs the same day - nothing to eat except water and meds for 8-10 hours prior.      Here is a list of Health Maintenance topics that are due now or due soon:  Health Maintenance Due   Topic Date Due     ANNUAL REVIEW OF HM ORDERS  1950     ADVANCE CARE PLANNING  1950     COLORECTAL CANCER SCREENING  01/01/1959     ZOSTER IMMUNIZATION (1 of 2) 01/01/1999     AORTIC ANEURYSM SCREENING (SYSTEM ASSIGNED)  01/01/2014     PHQ-2  01/01/2020     FALL RISK ASSESSMENT  08/30/2020     PNEUMOCOCCAL IMMUNIZATION 65+ LOW/MEDIUM RISK (2 of 2 - PPSV23) 07/26/2020     MEDICARE ANNUAL WELLNESS VISIT  08/30/2020     INFLUENZA VACCINE (1) 09/01/2020       Please call us at [phone] (or use [employer]) to address the above recommendations.     Thank you for trusting [facility] and we appreciate the opportunity to serve you.  We look forward to supporting your healthcare needs in the future.    Healthy Regards,    Jolynn Araiza PA-C

## 2020-08-31 NOTE — TELEPHONE ENCOUNTER
Needs of attention regarding:  -Colon Cancer Screening  -Wellness (Physical) Visit     Health Maintenance Topics with due status: Overdue       Topic Date Due    ANNUAL REVIEW OF HM ORDERS 1950    ADVANCE CARE PLANNING 1950    COLORECTAL CANCER SCREENING 01/01/1959    ZOSTER IMMUNIZATION 01/01/1999    AORTIC ANEURYSM SCREENING (SYSTEM ASSIGNED) 01/01/2014    PHQ-2 01/01/2020    FALL RISK ASSESSMENT 08/30/2020     Health Maintenance Topics with due status: Due On       Topic Date Due    PNEUMOCOCCAL IMMUNIZATION 65+ LOW/MEDIUM RISK 07/26/2020    MEDICARE ANNUAL WELLNESS VISIT 08/30/2020     Health Maintenance Topics with due status: Due Soon       Topic Date Due    INFLUENZA VACCINE 09/01/2020       Communication:  See Letter

## 2020-08-31 NOTE — TELEPHONE ENCOUNTER
Routing refill request to provider for review/approval because:  Labs not current:  KELLY BowmanN, RN  [employer] [facility]

## 2020-09-01 RX ORDER — ATORVASTATIN CALCIUM 20 MG/1
TABLET, FILM COATED ORAL
Qty: 90 TABLET | Refills: 0 | Status: SHIPPED | OUTPATIENT
Start: 2020-09-01 | End: 2021-01-05

## 2020-09-02 NOTE — TELEPHONE ENCOUNTER
Left non detailed message for patient asking him to call us back.  Please see below patient needs to set up preventative visit.    Anisha Jimenez MA

## 2020-09-02 NOTE — TELEPHONE ENCOUNTER
Refill signed. However, he is due for follow up for preventive visit and labs. Please help schedule OV.    Dick Lyles DO  9/1/2020 11:14 PM

## 2020-09-04 NOTE — TELEPHONE ENCOUNTER
Left a detailed message for patient to call back and schedule an appointment.  Rosalia Kerr MA

## 2021-01-05 ENCOUNTER — OFFICE VISIT (OUTPATIENT)
Dept: INTERNAL MEDICINE | Facility: CLINIC | Age: 71
End: 2021-01-05
Payer: COMMERCIAL

## 2021-01-05 ENCOUNTER — TELEPHONE (OUTPATIENT)
Dept: INTERNAL MEDICINE | Facility: CLINIC | Age: 71
End: 2021-01-05

## 2021-01-05 VITALS
WEIGHT: 189.5 LBS | DIASTOLIC BLOOD PRESSURE: 90 MMHG | HEIGHT: 68 IN | OXYGEN SATURATION: 98 % | HEART RATE: 78 BPM | BODY MASS INDEX: 28.72 KG/M2 | RESPIRATION RATE: 16 BRPM | SYSTOLIC BLOOD PRESSURE: 178 MMHG | TEMPERATURE: 97.9 F

## 2021-01-05 DIAGNOSIS — E78.5 HYPERLIPIDEMIA LDL GOAL <130: ICD-10-CM

## 2021-01-05 DIAGNOSIS — I10 HYPERTENSION GOAL BP (BLOOD PRESSURE) < 140/90: ICD-10-CM

## 2021-01-05 DIAGNOSIS — G60.9 IDIOPATHIC PERIPHERAL NEUROPATHY: Primary | ICD-10-CM

## 2021-01-05 LAB
ALBUMIN SERPL-MCNC: 3.8 G/DL (ref 3.4–5)
ALP SERPL-CCNC: 77 U/L (ref 40–150)
ALT SERPL W P-5'-P-CCNC: 39 U/L (ref 0–70)
ANION GAP SERPL CALCULATED.3IONS-SCNC: 6 MMOL/L (ref 3–14)
AST SERPL W P-5'-P-CCNC: 25 U/L (ref 0–45)
BILIRUB SERPL-MCNC: 0.3 MG/DL (ref 0.2–1.3)
BUN SERPL-MCNC: 14 MG/DL (ref 7–30)
CALCIUM SERPL-MCNC: 8.4 MG/DL (ref 8.5–10.1)
CHLORIDE SERPL-SCNC: 106 MMOL/L (ref 94–109)
CHOLEST SERPL-MCNC: 197 MG/DL
CO2 SERPL-SCNC: 26 MMOL/L (ref 20–32)
CREAT SERPL-MCNC: 0.95 MG/DL (ref 0.66–1.25)
CREAT UR-MCNC: 59 MG/DL
ERYTHROCYTE [DISTWIDTH] IN BLOOD BY AUTOMATED COUNT: 13.9 % (ref 10–15)
GFR SERPL CREATININE-BSD FRML MDRD: 79 ML/MIN/{1.73_M2}
GLUCOSE SERPL-MCNC: 89 MG/DL (ref 70–99)
HBA1C MFR BLD: 5.4 % (ref 0–5.6)
HCT VFR BLD AUTO: 43.9 % (ref 40–53)
HDLC SERPL-MCNC: 52 MG/DL
HGB BLD-MCNC: 14.6 G/DL (ref 13.3–17.7)
LDLC SERPL CALC-MCNC: 129 MG/DL
MCH RBC QN AUTO: 28.2 PG (ref 26.5–33)
MCHC RBC AUTO-ENTMCNC: 33.3 G/DL (ref 31.5–36.5)
MCV RBC AUTO: 85 FL (ref 78–100)
MICROALBUMIN UR-MCNC: 9 MG/L
MICROALBUMIN/CREAT UR: 16.14 MG/G CR (ref 0–17)
NONHDLC SERPL-MCNC: 145 MG/DL
PLATELET # BLD AUTO: 179 10E9/L (ref 150–450)
POTASSIUM SERPL-SCNC: 3.9 MMOL/L (ref 3.4–5.3)
PROT SERPL-MCNC: 7.4 G/DL (ref 6.8–8.8)
RBC # BLD AUTO: 5.17 10E12/L (ref 4.4–5.9)
SODIUM SERPL-SCNC: 138 MMOL/L (ref 133–144)
TRIGL SERPL-MCNC: 82 MG/DL
WBC # BLD AUTO: 9.6 10E9/L (ref 4–11)

## 2021-01-05 PROCEDURE — 99214 OFFICE O/P EST MOD 30 MIN: CPT | Performed by: NURSE PRACTITIONER

## 2021-01-05 PROCEDURE — 36415 COLL VENOUS BLD VENIPUNCTURE: CPT | Performed by: NURSE PRACTITIONER

## 2021-01-05 PROCEDURE — 83036 HEMOGLOBIN GLYCOSYLATED A1C: CPT | Performed by: NURSE PRACTITIONER

## 2021-01-05 PROCEDURE — 80053 COMPREHEN METABOLIC PANEL: CPT | Performed by: NURSE PRACTITIONER

## 2021-01-05 PROCEDURE — 85027 COMPLETE CBC AUTOMATED: CPT | Performed by: NURSE PRACTITIONER

## 2021-01-05 PROCEDURE — 80061 LIPID PANEL: CPT | Performed by: NURSE PRACTITIONER

## 2021-01-05 PROCEDURE — 82043 UR ALBUMIN QUANTITATIVE: CPT | Performed by: NURSE PRACTITIONER

## 2021-01-05 RX ORDER — AMLODIPINE BESYLATE 5 MG/1
TABLET ORAL
Qty: 90 TABLET | Refills: 2 | Status: SHIPPED | OUTPATIENT
Start: 2021-01-05 | End: 2021-02-01

## 2021-01-05 RX ORDER — GABAPENTIN 300 MG/1
300 CAPSULE ORAL
Qty: 60 CAPSULE | Refills: 1 | Status: SHIPPED | OUTPATIENT
Start: 2021-01-05 | End: 2021-02-01

## 2021-01-05 RX ORDER — ATORVASTATIN CALCIUM 20 MG/1
20 TABLET, FILM COATED ORAL DAILY
Qty: 90 TABLET | Refills: 3 | Status: SHIPPED | OUTPATIENT
Start: 2021-01-05

## 2021-01-05 RX ORDER — LOSARTAN POTASSIUM 100 MG/1
100 TABLET ORAL DAILY
Qty: 90 TABLET | Refills: 3 | Status: SHIPPED | OUTPATIENT
Start: 2021-01-05

## 2021-01-05 ASSESSMENT — MIFFLIN-ST. JEOR: SCORE: 1584.07

## 2021-01-05 NOTE — PROGRESS NOTES
"  Assessment & Plan     Hypertension goal BP (blood pressure) < 140/90    - amLODIPine (NORVASC) 5 MG tablet; TAKE 1 TABLET(5 MG) BY MOUTH DAILY  - losartan (COZAAR) 100 MG tablet; Take 1 tablet (100 mg) by mouth daily  - CBC with platelets  - Comprehensive metabolic panel  - Albumin Random Urine Quantitative with Creat Ratio  - Hemoglobin A1c    Hyperlipidemia LDL goal <130    - atorvastatin (LIPITOR) 20 MG tablet; Take 1 tablet (20 mg) by mouth daily  - Lipid Profile                     BMI:   Estimated body mass index is 28.81 kg/m  as calculated from the following:    Height as of this encounter: 1.727 m (5' 8\").    Weight as of this encounter: 86 kg (189 lb 8 oz).         Restart all meds.  Labs pending  F/u 1 month  Consider gabapentin for possible neuropathy    Jennifer Vargas NP  [facility]     Young Dickson is a 71 year old male who presents to clinic today for the following health issues     HPI       Hyperlipidemia Follow-Up      Are you regularly taking any medication or supplement to lower your cholesterol?   Yes- statin    Are you having muscle aches or other side effects that you think could be caused by your cholesterol lowering medication?  No    Hypertension Follow-up      Do you check your blood pressure regularly outside of the clinic? No   Off meds x 2 weeks 2/2 R leg pain    Are you following a low salt diet? Yes    Are your blood pressures ever more than 140 on the top number (systolic) OR more   than 90 on the bottom number (diastolic), for example 140/90? na        Concern - R leg pain  Onset: 3 weeks  Description: pain RLE foot, lateral ankle to knee, intermittent  Intensity: mild  Progression of Symptoms:  same  Accompanying Signs & Symptoms: denies warmth, swelling, h/o DVT  Previous history of similar problem: none  Precipitating factors:        Worsened by: none  Alleviating factors:        Improved by: none  Therapies tried and outcome: " " none         Review of Systems   CONSTITUTIONAL: NEGATIVE for fever, chills, change in weight  ENT/MOUTH: NEGATIVE for ear, mouth and throat problems  RESP: NEGATIVE for significant cough or SOB  CV: NEGATIVE for chest pain, palpitations or peripheral edema      Objective    BP (!) 178/90 (BP Location: Right arm, Patient Position: Sitting, Cuff Size: Adult Large)   Pulse 78   Temp 97.9  F (36.6  C) (Oral)   Resp 16   Ht 1.727 m (5' 8\")   Wt 86 kg (189 lb 8 oz)   SpO2 98%   BMI 28.81 kg/m    Body mass index is 28.81 kg/m .  Physical Exam   GENERAL: healthy, alert and no distress  NECK: no adenopathy, no asymmetry, masses, or scars and thyroid normal to palpation  RESP: lungs clear to auscultation - no rales, rhonchi or wheezes  CV: regular rate and rhythm, normal S1 S2, no S3 or S4, no murmur, click or rub, no peripheral edema and peripheral pulses strong  CV: peripheral pulses strong and negative Homans.  No redness, swelling  ABDOMEN: soft, nontender, no hepatosplenomegaly, no masses and bowel sounds normal        Jennifer Vargas CNP          "

## 2021-01-05 NOTE — TELEPHONE ENCOUNTER
Advise pt suspect leg pain due to neuropathy, nerve pain  eRx sent gabapentin BID.  Let us know after 1 month if helpful  Jennifer Vargas CNP

## 2021-01-05 NOTE — NURSING NOTE
"Patient her for pain in rt leg for 3 weeks.  Vital signs:  Temp: 97.9  F (36.6  C) Temp src: Oral BP: (!) 178/90 Pulse: 78   Resp: 16 SpO2: 98 %     Height: 172.7 cm (5' 8\") Weight: 86 kg (189 lb 8 oz)  Estimated body mass index is 28.81 kg/m  as calculated from the following:    Height as of this encounter: 1.727 m (5' 8\").    Weight as of this encounter: 86 kg (189 lb 8 oz).        "

## 2021-02-01 ENCOUNTER — OFFICE VISIT (OUTPATIENT)
Dept: INTERNAL MEDICINE | Facility: CLINIC | Age: 71
End: 2021-02-01
Payer: COMMERCIAL

## 2021-02-01 VITALS
HEART RATE: 83 BPM | WEIGHT: 183.4 LBS | TEMPERATURE: 98.3 F | BODY MASS INDEX: 27.8 KG/M2 | RESPIRATION RATE: 16 BRPM | SYSTOLIC BLOOD PRESSURE: 146 MMHG | OXYGEN SATURATION: 100 % | DIASTOLIC BLOOD PRESSURE: 82 MMHG | HEIGHT: 68 IN

## 2021-02-01 DIAGNOSIS — G60.9 IDIOPATHIC PERIPHERAL NEUROPATHY: ICD-10-CM

## 2021-02-01 DIAGNOSIS — I10 HYPERTENSION GOAL BP (BLOOD PRESSURE) < 140/90: ICD-10-CM

## 2021-02-01 PROCEDURE — 99214 OFFICE O/P EST MOD 30 MIN: CPT | Performed by: NURSE PRACTITIONER

## 2021-02-01 RX ORDER — AMLODIPINE BESYLATE 5 MG/1
10 TABLET ORAL DAILY
Qty: 180 TABLET | Refills: 2 | Status: SHIPPED | OUTPATIENT
Start: 2021-02-01

## 2021-02-01 RX ORDER — GABAPENTIN 300 MG/1
600 CAPSULE ORAL
Qty: 120 CAPSULE | Refills: 1 | Status: SHIPPED | OUTPATIENT
Start: 2021-02-01 | End: 2021-02-24

## 2021-02-01 ASSESSMENT — MIFFLIN-ST. JEOR: SCORE: 1556.4

## 2021-02-01 NOTE — NURSING NOTE
"rt leg painful.  Vital signs:  Temp: 98.3  F (36.8  C) Temp src: Oral BP: (!) 146/82 Pulse: 83   Resp: 16 SpO2: 100 %     Height: 172.7 cm (5' 8\") Weight: 83.2 kg (183 lb 6.4 oz)  Estimated body mass index is 27.89 kg/m  as calculated from the following:    Height as of this encounter: 1.727 m (5' 8\").    Weight as of this encounter: 83.2 kg (183 lb 6.4 oz).        "

## 2021-02-01 NOTE — PROGRESS NOTES
"  Assessment & Plan     Hypertension goal BP (blood pressure) < 140/90    - amLODIPine (NORVASC) 5 MG tablet; Take 2 tablets (10 mg) by mouth daily TAKE 2 TABLET(5 MG) BY MOUTH DAILY    Idiopathic peripheral neuropathy    - gabapentin (NEURONTIN) 300 MG capsule; Take 2 capsules (600 mg) by mouth 2 times daily        Increase meds as noted, f/u 1 month               BMI:   Estimated body mass index is 27.89 kg/m  as calculated from the following:    Height as of this encounter: 1.727 m (5' 8\").    Weight as of this encounter: 83.2 kg (183 lb 6.4 oz).             Jennifer Vargas NP  [facility] DANA Vidal is a 71 year old who presents to clinic today for the following health issues     HPI       Hypertension Follow-up      Do you check your blood pressure regularly outside of the clinic? No     Are you following a low salt diet? Yes    Are your blood pressures ever more than 140 on the top number (systolic) OR more   than 90 on the bottom number (diastolic), for example 140/90? na        Concern - RLE neuropathy  Onset: months  Description: pain, tingling RLE lateral calf, ankle, foot and sofy forefoot  Intensity: moderate  Progression of Symptoms:  intermittent  Accompanying Signs & Symptoms: none  Previous history of similar problem: none  Precipitating factors:        Worsened by: none  Alleviating factors:        Improved by: none  Therapies tried and outcome: gabapentin      Review of Systems   Constitutional, HEENT, cardiovascular, pulmonary, gi and gu systems are negative, except as otherwise noted.      Objective    BP (!) 146/82 (BP Location: Right arm, Patient Position: Sitting, Cuff Size: Adult Regular)   Pulse 83   Temp 98.3  F (36.8  C) (Oral)   Resp 16   Ht 1.727 m (5' 8\")   Wt 83.2 kg (183 lb 6.4 oz)   SpO2 100%   BMI 27.89 kg/m    Body mass index is 27.89 kg/m .  Physical Exam   GENERAL: healthy, alert and no distress  NEURO: RLE: Normal strength and " tone, sensory deficit lateral foot and proprioception normal

## 2021-02-24 ENCOUNTER — OFFICE VISIT (OUTPATIENT)
Dept: INTERNAL MEDICINE | Facility: CLINIC | Age: 71
End: 2021-02-24
Payer: COMMERCIAL

## 2021-02-24 VITALS
OXYGEN SATURATION: 100 % | BODY MASS INDEX: 28.45 KG/M2 | HEART RATE: 87 BPM | TEMPERATURE: 97.9 F | DIASTOLIC BLOOD PRESSURE: 74 MMHG | RESPIRATION RATE: 16 BRPM | WEIGHT: 187.7 LBS | SYSTOLIC BLOOD PRESSURE: 146 MMHG | HEIGHT: 68 IN

## 2021-02-24 DIAGNOSIS — G60.9 IDIOPATHIC PERIPHERAL NEUROPATHY: ICD-10-CM

## 2021-02-24 PROCEDURE — 99213 OFFICE O/P EST LOW 20 MIN: CPT | Performed by: NURSE PRACTITIONER

## 2021-02-24 RX ORDER — GABAPENTIN 300 MG/1
600 CAPSULE ORAL 3 TIMES DAILY
Qty: 120 CAPSULE | Refills: 1 | Status: SHIPPED | OUTPATIENT
Start: 2021-02-24 | End: 2021-03-22

## 2021-02-24 ASSESSMENT — MIFFLIN-ST. JEOR: SCORE: 1575.9

## 2021-02-24 NOTE — PROGRESS NOTES
"    Assessment & Plan     Idiopathic peripheral neuropathy    - gabapentin (NEURONTIN) 300 MG capsule; Take 2 capsules (600 mg) by mouth 3 times daily  - NEUROLOGY ADULT REFERRAL    F/u PCP pending neuro consult         BMI:   Estimated body mass index is 28.54 kg/m  as calculated from the following:    Height as of this encounter: 1.727 m (5' 8\").    Weight as of this encounter: 85.1 kg (187 lb 11.2 oz).           Jennifer Vargas NP  [facility] DANA Vidal is a 71 year old who presents for the following health issues     HPI       Musculoskeletal problem/pain  Onset/Duration: ongoing  Description  Location: leg - right, down to foot, up to back of knee  Joint Swelling: no  Redness: no  Pain: YES sharp, stabbing  Warmth: no  Intensity:  moderate  Progression of Symptoms:  worsening  Accompanying signs and symptoms:   Fevers: no  Numbness/tingling/weakness: YES  History  Trauma to the area: no  Recent illness:  no  Previous similar problem: YES  Previous evaluation:  YES  Precipitating or alleviating factors:  Aggravating factors include: constant  Therapies tried and outcome: nothing and Ibuprofen        Review of Systems   Constitutional, HEENT, cardiovascular, pulmonary, gi and gu systems are negative, except as otherwise noted.      Objective    BP (!) 146/74 (BP Location: Right arm, Patient Position: Chair, Cuff Size: Adult Regular)   Pulse 87   Temp 97.9  F (36.6  C) (Oral)   Resp 16   Ht 1.727 m (5' 8\")   Wt 85.1 kg (187 lb 11.2 oz)   SpO2 100%   BMI 28.54 kg/m      Physical Exam   GENERAL: healthy, alert and no distress  NEURO: RLE - Normal strength and tone, sensory exam grossly normal, light touch and pinprick normal and proprioception normal                  "

## 2021-03-03 ENCOUNTER — TRANSFERRED RECORDS (OUTPATIENT)
Dept: HEALTH INFORMATION MANAGEMENT | Facility: CLINIC | Age: 71
End: 2021-03-03

## 2021-03-17 ENCOUNTER — TRANSFERRED RECORDS (OUTPATIENT)
Dept: HEALTH INFORMATION MANAGEMENT | Facility: CLINIC | Age: 71
End: 2021-03-17

## 2021-03-18 DIAGNOSIS — G60.9 IDIOPATHIC PERIPHERAL NEUROPATHY: ICD-10-CM

## 2021-03-19 NOTE — TELEPHONE ENCOUNTER
Pending Prescriptions:                       Disp   Refills    gabapentin (NEURONTIN) 300 MG capsule      120 ca*1        Sig: Take 2 capsules (600 mg) by mouth 3 times daily    Routing refill request to provider for review/approval because:  Drug not on the [facility] refill protocol

## 2021-03-22 RX ORDER — GABAPENTIN 300 MG/1
600 CAPSULE ORAL 3 TIMES DAILY
Qty: 120 CAPSULE | Refills: 1 | Status: SHIPPED | OUTPATIENT
Start: 2021-03-22 | End: 2021-04-05

## 2021-04-06 ENCOUNTER — OFFICE VISIT (OUTPATIENT)
Dept: INTERNAL MEDICINE | Facility: CLINIC | Age: 71
End: 2021-04-06
Payer: COMMERCIAL

## 2021-04-06 VITALS
HEIGHT: 68 IN | TEMPERATURE: 98.4 F | WEIGHT: 182 LBS | BODY MASS INDEX: 27.58 KG/M2 | OXYGEN SATURATION: 99 % | HEART RATE: 87 BPM | DIASTOLIC BLOOD PRESSURE: 69 MMHG | SYSTOLIC BLOOD PRESSURE: 122 MMHG

## 2021-04-06 DIAGNOSIS — G57.31 NEUROPATHY OF RIGHT PERONEAL NERVE: Primary | ICD-10-CM

## 2021-04-06 DIAGNOSIS — E55.9 VITAMIN D DEFICIENCY: ICD-10-CM

## 2021-04-06 LAB — ERYTHROCYTE [SEDIMENTATION RATE] IN BLOOD BY WESTERGREN METHOD: 8 MM/H (ref 0–20)

## 2021-04-06 PROCEDURE — 85652 RBC SED RATE AUTOMATED: CPT | Performed by: INTERNAL MEDICINE

## 2021-04-06 PROCEDURE — 82306 VITAMIN D 25 HYDROXY: CPT | Performed by: INTERNAL MEDICINE

## 2021-04-06 PROCEDURE — 36415 COLL VENOUS BLD VENIPUNCTURE: CPT | Performed by: INTERNAL MEDICINE

## 2021-04-06 PROCEDURE — 82607 VITAMIN B-12: CPT | Performed by: INTERNAL MEDICINE

## 2021-04-06 PROCEDURE — 99213 OFFICE O/P EST LOW 20 MIN: CPT | Performed by: INTERNAL MEDICINE

## 2021-04-06 ASSESSMENT — MIFFLIN-ST. JEOR: SCORE: 1550.05

## 2021-04-06 NOTE — LETTER
[place] [address]  [facility] [address]  [phone]          April 6, 2021    RE:  Young Dickson                                                                                                                                                       [address] [address]            To whom it may concern:    Young Dickson is under my professional care for    Neuropathy of right peroneal nerve  Vitamin D deficiency     Please excuse from work for 3 days for medical reasons.   Patient can return to work on 4/10/21  Sincerely,        Tomasz Lutz MD

## 2021-04-06 NOTE — PROGRESS NOTES
"    Assessment & Plan     Neuropathy of right peroneal nerve  Follow up with neurology  Assess lab work   Refer to massage therapy   - Vitamin B12  - Vitamin D Deficiency  - Erythrocyte sedimentation rate auto  - MASSAGE THERAPY REFERRAL    Vitamin D deficiency    - Vitamin D Deficiency             See Patient Instructions    Return in about 4 weeks (around 5/4/2021) for follow up on acute problem if persists.    Tomasz Lutz MD  [facility] DANA Vidal is a 71 year old who presents for the following health issues     HPI   Chief Complaint   Patient presents with     Pain     Rt leg pain, F/U from Neurology         Patient is seen for a follow up visit.  Patient has symptoms of pain in the right lateral lower leg, from the knee to the foot and great toe.   No injury, on and off, worse with ambulation, no leg swelling, no skin changes.  Seen neurology. Negative studies so far. Does exercises as recommended by PT.         Review of Systems   Constitutional, HEENT, cardiovascular, pulmonary, gi and gu systems are negative, except as otherwise noted.      Objective    /69 (BP Location: Left arm, Patient Position: Sitting, Cuff Size: Adult Regular)   Pulse 87   Temp 98.4  F (36.9  C) (Oral)   Ht 1.727 m (5' 8\")   Wt 82.6 kg (182 lb)   SpO2 99%   BMI 27.67 kg/m    Body mass index is 27.67 kg/m .  Physical Exam   GENERAL: healthy, alert and no distress  MS: no gross musculoskeletal defects noted, no edema  Right lateral calf pain with palpation, no edema, mild weakness of the dorsiflexion     Office Visit on 01/05/2021   Component Date Value Ref Range Status     WBC 01/05/2021 9.6  4.0 - 11.0 10e9/L Final     RBC Count 01/05/2021 5.17  4.4 - 5.9 10e12/L Final     Hemoglobin 01/05/2021 14.6  13.3 - 17.7 g/dL Final     Hematocrit 01/05/2021 43.9  40.0 - 53.0 % Final     MCV 01/05/2021 85  78 - 100 fl Final     MCH 01/05/2021 28.2  26.5 - 33.0 pg Final     MCHC 01/05/2021 " 33.3  31.5 - 36.5 g/dL Final     RDW 01/05/2021 13.9  10.0 - 15.0 % Final     Platelet Count 01/05/2021 179  150 - 450 10e9/L Final     Sodium 01/05/2021 138  133 - 144 mmol/L Final     Potassium 01/05/2021 3.9  3.4 - 5.3 mmol/L Final     Chloride 01/05/2021 106  94 - 109 mmol/L Final     Carbon Dioxide 01/05/2021 26  20 - 32 mmol/L Final     Anion Gap 01/05/2021 6  3 - 14 mmol/L Final     Glucose 01/05/2021 89  70 - 99 mg/dL Final    Fasting specimen     Urea Nitrogen 01/05/2021 14  7 - 30 mg/dL Final     Creatinine 01/05/2021 0.95  0.66 - 1.25 mg/dL Final     GFR Estimate 01/05/2021 79  >60 mL/min/[1.73_m2] Final    Comment: Non  GFR Calc  Starting 12/18/2018, serum creatinine based estimated GFR (eGFR) will be   calculated using the Chronic Kidney Disease Epidemiology Collaboration   (CKD-EPI) equation.       GFR Estimate If Black 01/05/2021 >90  >60 mL/min/[1.73_m2] Final    Comment:  GFR Calc  Starting 12/18/2018, serum creatinine based estimated GFR (eGFR) will be   calculated using the Chronic Kidney Disease Epidemiology Collaboration   (CKD-EPI) equation.       Calcium 01/05/2021 8.4* 8.5 - 10.1 mg/dL Final     Bilirubin Total 01/05/2021 0.3  0.2 - 1.3 mg/dL Final     Albumin 01/05/2021 3.8  3.4 - 5.0 g/dL Final     Protein Total 01/05/2021 7.4  6.8 - 8.8 g/dL Final     Alkaline Phosphatase 01/05/2021 77  40 - 150 U/L Final     ALT 01/05/2021 39  0 - 70 U/L Final     AST 01/05/2021 25  0 - 45 U/L Final     Cholesterol 01/05/2021 197  <200 mg/dL Final     Triglycerides 01/05/2021 82  <150 mg/dL Final    Fasting specimen     HDL Cholesterol 01/05/2021 52  >39 mg/dL Final     LDL Cholesterol Calculated 01/05/2021 129* <100 mg/dL Final    Comment: Above desirable:  100-129 mg/dl  Borderline High:  130-159 mg/dL  High:             160-189 mg/dL  Very high:       >189 mg/dl       Non HDL Cholesterol 01/05/2021 145* <130 mg/dL Final    Comment: Above Desirable:  130-159  mg/dl  Borderline high:  160-189 mg/dl  High:             190-219 mg/dl  Very high:       >219 mg/dl       Creatinine Urine 01/05/2021 59  mg/dL Final     Albumin Urine mg/L 01/05/2021 9  mg/L Final     Albumin Urine mg/g Cr 01/05/2021 16.14  0 - 17 mg/g Cr Final     Hemoglobin A1C 01/05/2021 5.4  0 - 5.6 % Final    Comment: Normal <5.7% Prediabetes 5.7-6.4%  Diabetes 6.5% or higher - adopted from ADA   consensus guidelines.

## 2021-04-07 LAB
DEPRECATED CALCIDIOL+CALCIFEROL SERPL-MC: 13 UG/L (ref 20–75)
VIT B12 SERPL-MCNC: 540 PG/ML (ref 193–986)

## 2021-05-19 ENCOUNTER — OFFICE VISIT (OUTPATIENT)
Dept: INTERNAL MEDICINE | Facility: CLINIC | Age: 71
End: 2021-05-19

## 2021-05-19 VITALS
OXYGEN SATURATION: 98 % | TEMPERATURE: 97.7 F | DIASTOLIC BLOOD PRESSURE: 77 MMHG | HEART RATE: 82 BPM | WEIGHT: 182.5 LBS | RESPIRATION RATE: 16 BRPM | HEIGHT: 68 IN | BODY MASS INDEX: 27.66 KG/M2 | SYSTOLIC BLOOD PRESSURE: 141 MMHG

## 2021-05-19 DIAGNOSIS — G60.9 IDIOPATHIC PERIPHERAL NEUROPATHY: ICD-10-CM

## 2021-05-19 DIAGNOSIS — M79.671 RIGHT FOOT PAIN: Primary | ICD-10-CM

## 2021-05-19 PROCEDURE — 99213 OFFICE O/P EST LOW 20 MIN: CPT | Performed by: NURSE PRACTITIONER

## 2021-05-19 RX ORDER — GABAPENTIN 300 MG/1
900 CAPSULE ORAL 3 TIMES DAILY
Qty: 270 CAPSULE | Refills: 4 | Status: SHIPPED | OUTPATIENT
Start: 2021-05-19 | End: 2021-08-23

## 2021-05-19 RX ORDER — GABAPENTIN 300 MG/1
900 CAPSULE ORAL 3 TIMES DAILY
Qty: 270 CAPSULE | Refills: 1 | Status: SHIPPED | OUTPATIENT
Start: 2021-05-19 | End: 2021-05-19

## 2021-05-19 ASSESSMENT — MIFFLIN-ST. JEOR: SCORE: 1552.31

## 2021-05-19 ASSESSMENT — PAIN SCALES - GENERAL: PAINLEVEL: MODERATE PAIN (4)

## 2021-05-19 NOTE — LETTER
[place] [address], [address]  [place], [place]  [address]                                            TEL:[phone]  FAX:[phone]      Young Dikcson  [address] [place] [id number]      May 19, 2021    To whom it may concern,    Young Dickson is a patient at our clinic.  He will be absent from work 5/19/21 through 5/21/21 for medical reasons.        Sincerely,      Jennifer Vargas C.N.P.

## 2021-05-19 NOTE — NURSING NOTE
"rt leg pain. Pain started in the heal now up to the knee.  Vital signs:  Temp: 97.7  F (36.5  C) Temp src: Oral BP: (!) 141/77 Pulse: 82   Resp: 16 SpO2: 98 %     Height: 172.7 cm (5' 8\") Weight: 82.8 kg (182 lb 8 oz)  Estimated body mass index is 27.75 kg/m  as calculated from the following:    Height as of this encounter: 1.727 m (5' 8\").    Weight as of this encounter: 82.8 kg (182 lb 8 oz).        "

## 2021-05-19 NOTE — PROGRESS NOTES
"    Assessment & Plan     Idiopathic peripheral neuropathy    - Orthopedic & Spine  Referral; Future  - gabapentin (NEURONTIN) 300 MG capsule; Take 3 capsules (900 mg) by mouth 3 times daily    Right foot pain    - Orthopedic & Spine  Referral; Future  - gabapentin (NEURONTIN) 300 MG capsule; Take 3 capsules (900 mg) by mouth 3 times daily    F/u pending ortho evaluation                 Jennifer Vargas NP  [facility] DANA Vidal is a 71 year old who presents for the following health issues     HPI     Musculoskeletal problem/pain  Onset/Duration: months  Description  Location: foot - right  Joint Swelling: no  Redness: no  Pain: YES  Warmth: no  Intensity:  mild, moderate  Progression of Symptoms:  waxing and waning  Accompanying signs and symptoms:   Fevers: no  Numbness/tingling/weakness: YES  History  Trauma to the area: no  Recent illness:  no  Previous similar problem: no  Previous evaluation:  YES- neurology  Precipitating or alleviating factors:  Aggravating factors include: standing  Therapies tried and outcome: massage, NSAID - physical therapy and gabapentin        Review of Systems   Constitutional, HEENT, cardiovascular, pulmonary, gi and gu systems are negative, except as otherwise noted.      Objective    BP (!) 141/77   Pulse 82   Temp 97.7  F (36.5  C) (Oral)   Resp 16   Ht 1.727 m (5' 8\")   Wt 82.8 kg (182 lb 8 oz)   SpO2 98%   BMI 27.75 kg/m    Body mass index is 27.75 kg/m .  Physical Exam   GENERAL: healthy, alert and no distress  MS: r foot, pain on heel, plantar fascia                 "

## 2021-08-17 ENCOUNTER — HOSPITAL ENCOUNTER (EMERGENCY)
Facility: CLINIC | Age: 71
Discharge: HOME | End: 2021-08-17
Attending: EMERGENCY MEDICINE | Admitting: EMERGENCY MEDICINE
Payer: COMMERCIAL

## 2021-08-17 VITALS
TEMPERATURE: 97.4 F | SYSTOLIC BLOOD PRESSURE: 151 MMHG | RESPIRATION RATE: 16 BRPM | HEART RATE: 68 BPM | OXYGEN SATURATION: 99 % | DIASTOLIC BLOOD PRESSURE: 88 MMHG

## 2021-08-17 DIAGNOSIS — M54.16 LUMBAR RADICULOPATHY: ICD-10-CM

## 2021-08-17 PROCEDURE — 99284 EMERGENCY DEPT VISIT MOD MDM: CPT | Mod: 25

## 2021-08-17 RX ORDER — HYDROCODONE BITARTRATE AND ACETAMINOPHEN 5; 325 MG/1; MG/1
1 TABLET ORAL EVERY 6 HOURS PRN
Qty: 12 TABLET | Refills: 0 | Status: SHIPPED | OUTPATIENT
Start: 2021-08-17 | End: 2021-08-20

## 2021-08-17 ASSESSMENT — ENCOUNTER SYMPTOMS
FEVER: 0
WEAKNESS: 0

## 2021-08-17 NOTE — LETTER
August 17, 2021      To Whom It May Concern:      Young Dickson was seen in our Emergency Department today, 08/17/21.  I expect his condition to improve over the next 3 days.  He may return to work when improved.    Sincerely,        Lilli VILLASENOR RN

## 2021-08-17 NOTE — ED PROVIDER NOTES
History   Chief Complaint:  Leg Pain       The history is provided by the patient.      Young Dickson is a 71 year old male with history of hyperlipidemia and hypertension who presents with right leg pain. Since January, Young has been experiencing intermittent sharp right foot pain in his toes and that has now, since last night, has now progressed laterally up to his right hip/side/lower back that is persistent and does relieve. This pain has caused his difficulty walking. There is no specific movement that worsens his pain.  He was diagnosed with peripheral neuropathy in May by his primary care team and was prescribed Gabapentin. He reports to have stopped taking Gabapentin and mentions visiting [facility] 5 days ago and was prescribed Lyrica that he reports to not be effective.  He had an MRI in March 2020 one of his right knee revealing meniscus injury but no abnormality to the peroneal nerve.  He denies experiencing any weakness, or any loss of sensation, denies any trouble controlling urine and bowel movements, denies fever or new skin changes/rashes. He has history of high blood pressure and high cholesterol, denies any other medical problems. He is currently not taking a blood thinner.     Review of Systems   Constitutional: Negative for fever.   Skin: Negative for rash.   Neurological: Negative for weakness.   All other systems reviewed and are negative.      Allergies:  Lisinopril    Medications:  Amlodipine  Atorvastatin  Losartan  Gabapentin   Lyrica    Past Medical History:    Overweight  Hyperlipidemia  Hypertension    Social History:  The patient is unaccompanied in the ED today.    Physical Exam     Patient Vitals for the past 24 hrs:   BP Temp Pulse Resp SpO2   08/17/21 0753 (!) 154/81 -- 70 -- --   08/17/21 0734 -- 97.4  F (36.3  C) 109 16 100 %       Physical Exam    HEENT:   Conjunctiva are normal and without erythema.    NECK:   Supple, no meningismus.     CV:    Regular rate and rhythm      No murmurs, rubs or gallops.      2+ dorsalis pedis pulses bilateral.  PULM:   Clear to auscultation bilateral.      No respiratory distress.  No stridor.  ABD:   Soft, non-tender, non-distendend.      No rebound or guarding.  MSK:   Patient is non-tender over the lumbar spine.      Nontender at the lumbar paraspinal musculature.      No step-off to the bony spine or overlying lesions.   LYMPH:  No cervical lymphadenopathy.  NEURO:  Strength is 5/5 and sensation is intact to the lower extremities bilateral.      2+ patellar and Achilles tendon reflexes bilateral.      No clonus and down-going Babinski bilateral.  SKIN:   Warm, dry and intact.    PYSCH:   Mood is good and affect is appropriate.      Emergency Department Course     Procedures  None     Emergency Department Course:    Reviewed:  I reviewed nursing notes, vitals, past medical history and care everywhere    Assessments:  902 I obtained history and examined the patient as noted above.     948 I rechecked the patient and explained findings.     Disposition:  The patient was discharged to home.       Impression & Plan     Medical Decision Makin-year-old male with history of chronic right lower extremity pain attributed to likely peripheral neuropathy now presents with increased pain that now radiates in a radicular distribution up to his hip.  His historical information is most consistent with lumbar radiculopathy.  He has no features concerning for cauda equina syndrome, epidural abscess, epidural hematoma or discitis.  No evidence of arterial insufficiency or soft tissue infection.  No trauma to draw concern for fracture.  Patient will be discharged home with outpatient MRI of the lumbar spine.  He is currently taking Lyrica.  I will add on limited supply of Norco and Voltaren gel.  Close follow-up with PCP and return to ED for worsening symptoms.    Diagnosis:    ICD-10-CM    1. Lumbar radiculopathy  M54.16 MRI Lumbar spine w/o contrast        Discharge Medications:  Discharge Medication List as of 8/17/2021  9:59 AM      START taking these medications    Details   diclofenac (VOLTAREN) 1 % topical gel Apply 2 g topically 4 times daily as needed for moderate pain, Disp-100 g, R-0, Local Print      HYDROcodone-acetaminophen (NORCO) 5-325 MG tablet Take 1 tablet by mouth every 6 hours as needed for severe pain, Disp-12 tablet, R-0, Local Print             Scribe Disclosure:  I, Luis A Sagastume, am serving as a scribe at 9:01 AM on 8/17/2021 to document services personally performed by Jonathan Paz MD based on my observations and the provider's statements to me.            Jonathan Paz MD  08/17/21 1761

## 2021-08-17 NOTE — ED TRIAGE NOTES
Pt c/o right lower lateral leg and foot pain. Pt reports that his leg is always painful, but is worse than usual today. RN palpates posterior calf and pt denies pain. Pt denies history of blood clots. Pt states that pain is sharp and radiates up from the top of his foot. ABC Intact. Denies new trauma to foot.

## 2021-08-17 NOTE — DISCHARGE INSTRUCTIONS
I have ordered an outpatient MRI of your low back as I am concerned there is a pinched nerve in your back that is causing your pain today.  They will call you to arrange a time to have this performed.    Please follow-up with your primary care doctor in the next 3 to 5 days for reevaluation.

## 2021-08-23 ENCOUNTER — HOSPITAL ENCOUNTER (EMERGENCY)
Facility: CLINIC | Age: 71
Discharge: HOME | End: 2021-08-23
Attending: EMERGENCY MEDICINE | Admitting: EMERGENCY MEDICINE
Payer: COMMERCIAL

## 2021-08-23 VITALS
DIASTOLIC BLOOD PRESSURE: 81 MMHG | SYSTOLIC BLOOD PRESSURE: 149 MMHG | HEART RATE: 63 BPM | OXYGEN SATURATION: 100 % | RESPIRATION RATE: 18 BRPM | TEMPERATURE: 97.4 F

## 2021-08-23 DIAGNOSIS — M54.10 RADICULAR PAIN OF RIGHT LOWER EXTREMITY: ICD-10-CM

## 2021-08-23 DIAGNOSIS — M54.16 LUMBAR RADICULOPATHY: ICD-10-CM

## 2021-08-23 PROCEDURE — 99282 EMERGENCY DEPT VISIT SF MDM: CPT

## 2021-08-23 RX ORDER — HYDROCODONE BITARTRATE AND ACETAMINOPHEN 5; 325 MG/1; MG/1
1 TABLET ORAL EVERY 6 HOURS PRN
Qty: 10 TABLET | Refills: 0 | Status: SHIPPED | OUTPATIENT
Start: 2021-08-23 | End: 2021-08-26

## 2021-08-23 ASSESSMENT — ENCOUNTER SYMPTOMS
VOMITING: 0
MYALGIAS: 1
BACK PAIN: 1
ABDOMINAL PAIN: 0
DIARRHEA: 0
NUMBNESS: 0

## 2021-08-23 NOTE — DISCHARGE INSTRUCTIONS
Please schedule your MRI of your lower back and follow up appointment with your primary care provider and/or [facility].

## 2021-08-23 NOTE — ED PROVIDER NOTES
History   Chief Complaint:  Back Pain       The history is provided by the patient.      Young Dickson is a 71 year old male with history of hyperlipidemia and hypertension who presents with right lower extremity pain. The patient has had back pain and right leg pain since January of this year. He mentions his leg pain is sharp and starts in his right big toe radiating around lateral ankle and then the side of his calf and onto the backside of his thigh and buttock. The pain is intermittent and there was no prior trauma or injury in January. He has been taking tylenol and ibuprofen when the pain gets worse but his has not helped. He began walking with a cane roughly 1 week ago after his pain worsened. The patient was seen in the ED on 8/17 for the same symptoms and was given Norco which he has run out of since then. He denies any pain in the middle of his back or abdominal pain, vomiting, or diarrhea. He has no numbness or tingling around his groin and denies loss of bowel or bladder control. He also mentions that someone was supposed to contact him to set up an outpatient MRI but nobody called.      Review of Systems   Gastrointestinal: Negative for abdominal pain, diarrhea and vomiting.   Genitourinary: Negative for enuresis.   Musculoskeletal: Positive for back pain and myalgias.   Neurological: Negative for numbness.   All other systems reviewed and are negative.      Allergies:  Lisinopril    Medications:  Norco  amLODIPine   atorvastatin   losartan   Lyrica    Past Medical History:    Hypertension  Hyperlipidemia    Social History:  Smoking status: Never  Alcohol use: Yes  Drug use: Never  PCP: Chele Green  Presents to the ED with alone.      Physical Exam     Patient Vitals for the past 24 hrs:   BP Temp Pulse Resp SpO2   08/23/21 1035 (!) 149/81 -- 63 18 100 %   08/23/21 1000 -- -- -- -- 99 %   08/23/21 0930 -- -- -- -- 100 %   08/23/21 0805 (!) 155/88 97.4  F (36.3  C) 71 18 100 %        Physical Exam  General: Alert, appears well-developed and well-nourished. Cooperative.     In no acute distress  HEENT:  Head:  Atraumatic  Ears:  External ears are normal  Mouth/Throat:  Oropharynx is without erythema or exudate and mucous membranes are moist.   Eyes:   Conjunctivae normal and EOM are normal. No scleral icterus.  CV:  Normal rate, regular rhythm, normal heart sounds and radial pulses are 2+ and symmetric.  No murmur.  Resp:  Breath sounds are clear bilaterally    Non-labored, no retractions or accessory muscle use  GI:  Abdomen is soft, no distension, no tenderness. No rebound or guarding.  No CVA tenderness bilaterally  MS:  Normal range of motion. No edema.    Normal strength in all 4 extremities.     Back atraumatic.    No midline cervical, thoracic, or lumbar tenderness  Skin:  Warm and dry.  No rash or lesions noted.  Neuro: Alert. Normal strength.  Sensation intact in all 4 extremities. GCS: 15    Antalgic gait.  Patient describes pain in the L4/L5 radiculopathy distribution to the right lower extremity. No active pain at bedside.  Psych:  Normal mood and affect.    Emergency Department Course       Procedures    Emergency Department Course:  Reviewed:  I reviewed the patient's nursing notes, vitals, past medical records, Care Everywhere.     Assessments:  1006 I performed an assessment and examination of the patient as noted above.      Disposition:  The patient was discharged to home.        Impression & Plan   Medical Decision Making:  Young Dickson is a 71 year old male who presents for evaluation of radicular symptoms. They have no history of back pain in the past, although symptoms have been progressive over the last 8 months.  His pain has improved with interventions in the emergency department during the prior visit, although he is out of his pain control medications.  Additionally the patient had an outpatient MRI ordered but has not yet been able to have this scheduled.  He  did not sustain any new trauma, therefore x-rays are not necessary due to the low likelihood of fracture or subluxation. Advanced imaging with CT/MRI is not indicated at this time, but should be scheduled in the outpatient setting.  We contacted radiology today and provided the patient a phone number to immediately schedule this in the next 1 to 3 days.  No indication for immediate consultation with neurosurgery or orthopedic spine surgery.  There is no evidence of cauda equina, discitis, spinal/epidural space hematoma or epidural abscess or other emergently worrisome etiology.     The neurological exam is normal, with the exception of the dermatomal symptoms noted.  The patient was advised that radiculopathy often takes significant time to resolve, and that follow up with primary care, neurology and/or neurosurgery will be indicated if symptoms do not improve. He will be discharged with pain medications to use as directed.  No heavy lifting, bending or twisting. Return if increasing pain, muscular weakness, or bowel or bladder dysfunction.     Diagnosis:    ICD-10-CM    1. Lumbar radiculopathy  M54.16    2. Radicular pain of right lower extremity  M54.10      Discharge Medications:  New Prescriptions    HYDROCODONE-ACETAMINOPHEN (NORCO) 5-325 MG TABLET    Take 1 tablet by mouth every 6 hours as needed for severe pain     Scribe Disclosure:  Jose HUYNH, (trainee) am serving as a scribe at 10:06 AM on 8/23/2021 to document services personally performed by Dick Shell MD based on my observations and the provider's statements to me.       Dick Shell MD  08/23/21 1037

## 2021-08-23 NOTE — LETTER
August 23, 2021      To Whom It May Concern:      Young Dickson was seen in our Emergency Department today, 08/23/21.  I expect his condition to improve over the next 2 days.  He may return to work 8/25/21.    Sincerely,        Dick Shell MD

## 2021-08-23 NOTE — ED TRIAGE NOTES
Patient presents to the ED reporting ongoing low back pain radiating down the right leg. States was seen in the ED on 8/17 for same symptoms. Pain medications are now gone. Patient states his discharge papers said someone was supposed to to contact him to set up an outpatient MRI, but nobody called.

## 2025-06-14 NOTE — RESULT ENCOUNTER NOTE
Please send the following letter:    Mr. Dickson,    -PSA (prostate specific antigen) test is normal.  -LDL(bad) cholesterol level is elevated which can increase your heart disease risk.  A diet high in fat and simple carbohydrates, genetics and being overweight can contribute to this. ADVISE: exercising 150 minutes of aerobic exercise per week (30 minutes for 5 days per week or 50 minutes for 3 days per week are options) and eating a low saturated fat/low carbohydrate diet are helpful to improve this.  You should discuss the pro's and con's of starting a statin to reduce your risk of coronary artery disease with Jolynn when you see her in late August.  We recommend discussion of initiation of a statin when your 10 year risk is above 7.5%.  Your's is listed below and is well above this, mainly due to your age.  -The 10-year ASCVD risk score (Ariancristi VARMA Jr., et al., 2013) is: 25.8%    Values used to calculate the score:      Age: 69 years      Sex: Male      Is Non- : Yes      Diabetic: No      Tobacco smoker: No      Systolic Blood Pressure: 156 mmHg      Is BP treated: Yes      HDL Cholesterol: 52 mg/dL      Total Cholesterol: 189 mg/dL   -Liver and gallbladder tests are normal (ALT,AST, Alk phos, bilirubin), kidney function is normal (Cr, GFR), sodium is normal, potassium is normal, calcium is normal, glucose is normal.  -Hepatitis C antibody screen test shows no signs of a previous hepatitis C infection.    If you have further questions about the interpretation of your labs, [email or web address] is a good website to check out for further information.      Please contact the clinic if you have additional questions.  Thank you.    Sincerely,    Bubba Sanchez MD        MED/SURG